# Patient Record
Sex: FEMALE | Race: OTHER | HISPANIC OR LATINO | Employment: UNEMPLOYED | URBAN - NONMETROPOLITAN AREA
[De-identification: names, ages, dates, MRNs, and addresses within clinical notes are randomized per-mention and may not be internally consistent; named-entity substitution may affect disease eponyms.]

---

## 2023-11-02 ENCOUNTER — HOSPITAL ENCOUNTER (EMERGENCY)
Facility: HOSPITAL | Age: 33
Discharge: HOME | End: 2023-11-02
Attending: EMERGENCY MEDICINE

## 2023-11-02 VITALS
DIASTOLIC BLOOD PRESSURE: 78 MMHG | SYSTOLIC BLOOD PRESSURE: 142 MMHG | TEMPERATURE: 98.7 F | WEIGHT: 203.71 LBS | OXYGEN SATURATION: 98 % | RESPIRATION RATE: 20 BRPM | HEART RATE: 72 BPM

## 2023-11-02 DIAGNOSIS — R00.2 PALPITATIONS: ICD-10-CM

## 2023-11-02 DIAGNOSIS — N39.0 UTI (URINARY TRACT INFECTION): ICD-10-CM

## 2023-11-02 DIAGNOSIS — R11.0 NAUSEA: Primary | ICD-10-CM

## 2023-11-02 LAB
ALBUMIN SERPL BCP-MCNC: 4.3 G/DL (ref 3.5–5)
ALP SERPL-CCNC: 48 U/L (ref 34–104)
ALT SERPL W P-5'-P-CCNC: 14 U/L (ref 7–52)
ANION GAP SERPL CALCULATED.3IONS-SCNC: 9 MMOL/L
AST SERPL W P-5'-P-CCNC: 12 U/L (ref 13–39)
BACTERIA UR QL AUTO: ABNORMAL /HPF
BASOPHILS # BLD AUTO: 0.06 THOUSANDS/ÂΜL (ref 0–0.1)
BASOPHILS NFR BLD AUTO: 1 % (ref 0–1)
BILIRUB SERPL-MCNC: 0.23 MG/DL (ref 0.2–1)
BILIRUB UR QL STRIP: NEGATIVE
BUN SERPL-MCNC: 13 MG/DL (ref 5–25)
CALCIUM SERPL-MCNC: 9.2 MG/DL (ref 8.4–10.2)
CARDIAC TROPONIN I PNL SERPL HS: <2 NG/L
CHLORIDE SERPL-SCNC: 108 MMOL/L (ref 96–108)
CLARITY UR: CLEAR
CO2 SERPL-SCNC: 23 MMOL/L (ref 21–32)
COLOR UR: ABNORMAL
CREAT SERPL-MCNC: 0.62 MG/DL (ref 0.6–1.3)
EOSINOPHIL # BLD AUTO: 0.09 THOUSAND/ÂΜL (ref 0–0.61)
EOSINOPHIL NFR BLD AUTO: 1 % (ref 0–6)
ERYTHROCYTE [DISTWIDTH] IN BLOOD BY AUTOMATED COUNT: 11.8 % (ref 11.6–15.1)
EXT PREGNANCY TEST URINE: NEGATIVE
EXT. CONTROL: NORMAL
FLUAV RNA RESP QL NAA+PROBE: NEGATIVE
FLUBV RNA RESP QL NAA+PROBE: NEGATIVE
GFR SERPL CREATININE-BSD FRML MDRD: 120 ML/MIN/1.73SQ M
GLUCOSE SERPL-MCNC: 120 MG/DL (ref 65–140)
GLUCOSE UR STRIP-MCNC: NEGATIVE MG/DL
HCT VFR BLD AUTO: 41.2 % (ref 34.8–46.1)
HGB BLD-MCNC: 13.1 G/DL (ref 11.5–15.4)
HGB UR QL STRIP.AUTO: NEGATIVE
IMM GRANULOCYTES # BLD AUTO: 0.01 THOUSAND/UL (ref 0–0.2)
IMM GRANULOCYTES NFR BLD AUTO: 0 % (ref 0–2)
KETONES UR STRIP-MCNC: NEGATIVE MG/DL
LEUKOCYTE ESTERASE UR QL STRIP: ABNORMAL
LIPASE SERPL-CCNC: 24 U/L (ref 11–82)
LYMPHOCYTES # BLD AUTO: 2.42 THOUSANDS/ÂΜL (ref 0.6–4.47)
LYMPHOCYTES NFR BLD AUTO: 32 % (ref 14–44)
MCH RBC QN AUTO: 31.3 PG (ref 26.8–34.3)
MCHC RBC AUTO-ENTMCNC: 31.8 G/DL (ref 31.4–37.4)
MCV RBC AUTO: 98 FL (ref 82–98)
MONOCYTES # BLD AUTO: 0.63 THOUSAND/ÂΜL (ref 0.17–1.22)
MONOCYTES NFR BLD AUTO: 8 % (ref 4–12)
NEUTROPHILS # BLD AUTO: 4.28 THOUSANDS/ÂΜL (ref 1.85–7.62)
NEUTS SEG NFR BLD AUTO: 58 % (ref 43–75)
NITRITE UR QL STRIP: NEGATIVE
NON-SQ EPI CELLS URNS QL MICRO: ABNORMAL /HPF
NRBC BLD AUTO-RTO: 0 /100 WBCS
PH UR STRIP.AUTO: 5.5 [PH]
PLATELET # BLD AUTO: 331 THOUSANDS/UL (ref 149–390)
PMV BLD AUTO: 9.8 FL (ref 8.9–12.7)
POTASSIUM SERPL-SCNC: 3.8 MMOL/L (ref 3.5–5.3)
PROT SERPL-MCNC: 7.6 G/DL (ref 6.4–8.4)
PROT UR STRIP-MCNC: NEGATIVE MG/DL
RBC # BLD AUTO: 4.19 MILLION/UL (ref 3.81–5.12)
RBC #/AREA URNS AUTO: ABNORMAL /HPF
RSV RNA RESP QL NAA+PROBE: NEGATIVE
SARS-COV-2 RNA RESP QL NAA+PROBE: NEGATIVE
SODIUM SERPL-SCNC: 140 MMOL/L (ref 135–147)
SP GR UR STRIP.AUTO: <=1.005 (ref 1–1.03)
TSH SERPL DL<=0.05 MIU/L-ACNC: 1.39 UIU/ML (ref 0.45–4.5)
UROBILINOGEN UR QL STRIP.AUTO: 0.2 E.U./DL
WBC # BLD AUTO: 7.49 THOUSAND/UL (ref 4.31–10.16)
WBC #/AREA URNS AUTO: ABNORMAL /HPF

## 2023-11-02 PROCEDURE — 96375 TX/PRO/DX INJ NEW DRUG ADDON: CPT

## 2023-11-02 PROCEDURE — 0241U HB NFCT DS VIR RESP RNA 4 TRGT: CPT | Performed by: EMERGENCY MEDICINE

## 2023-11-02 PROCEDURE — 96361 HYDRATE IV INFUSION ADD-ON: CPT

## 2023-11-02 PROCEDURE — 80053 COMPREHEN METABOLIC PANEL: CPT | Performed by: EMERGENCY MEDICINE

## 2023-11-02 PROCEDURE — 99283 EMERGENCY DEPT VISIT LOW MDM: CPT

## 2023-11-02 PROCEDURE — 83690 ASSAY OF LIPASE: CPT | Performed by: EMERGENCY MEDICINE

## 2023-11-02 PROCEDURE — 36415 COLL VENOUS BLD VENIPUNCTURE: CPT | Performed by: EMERGENCY MEDICINE

## 2023-11-02 PROCEDURE — 93005 ELECTROCARDIOGRAM TRACING: CPT

## 2023-11-02 PROCEDURE — 85025 COMPLETE CBC W/AUTO DIFF WBC: CPT | Performed by: EMERGENCY MEDICINE

## 2023-11-02 PROCEDURE — 99285 EMERGENCY DEPT VISIT HI MDM: CPT | Performed by: EMERGENCY MEDICINE

## 2023-11-02 PROCEDURE — 81001 URINALYSIS AUTO W/SCOPE: CPT | Performed by: EMERGENCY MEDICINE

## 2023-11-02 PROCEDURE — 96374 THER/PROPH/DIAG INJ IV PUSH: CPT

## 2023-11-02 PROCEDURE — 84443 ASSAY THYROID STIM HORMONE: CPT | Performed by: EMERGENCY MEDICINE

## 2023-11-02 PROCEDURE — 81025 URINE PREGNANCY TEST: CPT | Performed by: EMERGENCY MEDICINE

## 2023-11-02 PROCEDURE — 84484 ASSAY OF TROPONIN QUANT: CPT | Performed by: EMERGENCY MEDICINE

## 2023-11-02 RX ORDER — KETOROLAC TROMETHAMINE 30 MG/ML
15 INJECTION, SOLUTION INTRAMUSCULAR; INTRAVENOUS ONCE
Status: COMPLETED | OUTPATIENT
Start: 2023-11-02 | End: 2023-11-02

## 2023-11-02 RX ORDER — ONDANSETRON 4 MG/1
4 TABLET, ORALLY DISINTEGRATING ORAL EVERY 6 HOURS PRN
Qty: 20 TABLET | Refills: 0 | Status: SHIPPED | OUTPATIENT
Start: 2023-11-02

## 2023-11-02 RX ORDER — CEPHALEXIN 250 MG/1
500 CAPSULE ORAL ONCE
Status: COMPLETED | OUTPATIENT
Start: 2023-11-02 | End: 2023-11-02

## 2023-11-02 RX ORDER — CEPHALEXIN 500 MG/1
500 CAPSULE ORAL 2 TIMES DAILY
Qty: 10 CAPSULE | Refills: 0 | Status: SHIPPED | OUTPATIENT
Start: 2023-11-02 | End: 2023-11-07

## 2023-11-02 RX ORDER — ONDANSETRON 2 MG/ML
4 INJECTION INTRAMUSCULAR; INTRAVENOUS ONCE
Status: COMPLETED | OUTPATIENT
Start: 2023-11-02 | End: 2023-11-02

## 2023-11-02 RX ADMIN — SODIUM CHLORIDE 1000 ML: 0.9 INJECTION, SOLUTION INTRAVENOUS at 21:12

## 2023-11-02 RX ADMIN — ONDANSETRON 4 MG: 2 INJECTION INTRAMUSCULAR; INTRAVENOUS at 21:11

## 2023-11-02 RX ADMIN — CEPHALEXIN 500 MG: 250 CAPSULE ORAL at 22:40

## 2023-11-02 RX ADMIN — KETOROLAC TROMETHAMINE 15 MG: 30 INJECTION, SOLUTION INTRAMUSCULAR; INTRAVENOUS at 21:12

## 2023-11-03 LAB
ATRIAL RATE: 88 BPM
P AXIS: 66 DEGREES
PR INTERVAL: 152 MS
QRS AXIS: 68 DEGREES
QRSD INTERVAL: 88 MS
QT INTERVAL: 382 MS
QTC INTERVAL: 462 MS
T WAVE AXIS: 43 DEGREES
VENTRICULAR RATE: 88 BPM

## 2023-11-03 PROCEDURE — 93010 ELECTROCARDIOGRAM REPORT: CPT | Performed by: INTERNAL MEDICINE

## 2023-11-03 NOTE — ED PROVIDER NOTES
History  Chief Complaint   Patient presents with    Nausea     C/O nausea, heart racing. Onset 2 hours ago. No vomiting     42-year-old female presents for eval patient of nausea and palpitations. Onset was about 2 hours prior to arrival.  Patient additionally reports nasal congestion, urinary frequency. Patient denies chest pain or dyspnea. Patient denies cough although does have a few episodes of coughing in the room, does admit to nasal congestion, denies known sick contacts. Patient reports nausea otherwise able to eat during the day today, denies vomiting or abdominal pain. She notes increased urinary frequency without painful urination. Patient's last menstrual cycle ended a few days ago and was normal for her. Patient does report 1 episode of lightheadedness earlier without syncope or falls. She states an episode of "tachycardia".  phone was used for this interaction. None       Past Medical History:   Diagnosis Date    Kidney calculus     Renal disorder        History reviewed. No pertinent surgical history. History reviewed. No pertinent family history. I have reviewed and agree with the history as documented. E-Cigarette/Vaping    E-Cigarette Use Never User      E-Cigarette/Vaping Substances     Social History     Tobacco Use    Smoking status: Never    Smokeless tobacco: Never   Vaping Use    Vaping Use: Never used   Substance Use Topics    Alcohol use: Never    Drug use: Never       Review of Systems   Constitutional:  Negative for appetite change and fever. HENT:  Positive for congestion. Negative for sore throat. Respiratory:  Negative for cough and shortness of breath. Cardiovascular:  Positive for palpitations. Negative for chest pain. Gastrointestinal:  Positive for nausea. Negative for abdominal pain, diarrhea and vomiting. Genitourinary:  Positive for frequency. Negative for dysuria. Neurological:  Positive for light-headedness. Negative for syncope. All other systems reviewed and are negative. Physical Exam  Physical Exam  Vitals reviewed. Constitutional:       General: She is not in acute distress. Appearance: Normal appearance. She is not ill-appearing, toxic-appearing or diaphoretic. HENT:      Head: Normocephalic and atraumatic. Right Ear: External ear normal.      Left Ear: External ear normal.      Nose: Nose normal.      Mouth/Throat:      Mouth: Mucous membranes are dry. Eyes:      General: No scleral icterus. Right eye: No discharge. Left eye: No discharge. Cardiovascular:      Rate and Rhythm: Normal rate and regular rhythm. Pulmonary:      Effort: Pulmonary effort is normal. No respiratory distress. Breath sounds: Normal breath sounds. No stridor. No wheezing, rhonchi or rales. Comments: Recurrent dry cough in room  Abdominal:      General: There is no distension. Palpations: Abdomen is soft. Tenderness: There is no abdominal tenderness. There is no guarding or rebound. Musculoskeletal:         General: No deformity or signs of injury. Skin:     General: Skin is warm. Coloration: Skin is not jaundiced or pale. Neurological:      General: No focal deficit present. Mental Status: She is alert.          Vital Signs  ED Triage Vitals [11/02/23 2040]   Temperature Pulse Respirations Blood Pressure SpO2   98.7 °F (37.1 °C) 89 18 138/77 97 %      Temp Source Heart Rate Source Patient Position - Orthostatic VS BP Location FiO2 (%)   Tympanic Monitor Lying Left arm --      Pain Score       No Pain           Vitals:    11/02/23 2040 11/02/23 2130 11/02/23 2200 11/02/23 2230   BP: 138/77 136/80 135/77 142/78   Pulse: 89 67 76 72   Patient Position - Orthostatic VS: Lying            Visual Acuity      ED Medications  Medications   sodium chloride 0.9 % bolus 1,000 mL (0 mL Intravenous Stopped 11/2/23 2216)   ondansetron (ZOFRAN) injection 4 mg (4 mg Intravenous Given 11/2/23 2111) ketorolac (TORADOL) injection 15 mg (15 mg Intravenous Given 11/2/23 2112)   cephalexin (KEFLEX) capsule 500 mg (500 mg Oral Given 11/2/23 2240)       Diagnostic Studies  Results Reviewed       Procedure Component Value Units Date/Time    Urine Microscopic [[id number]]  (Abnormal) Collected: 11/02/23 2202    Lab Status: Final result Specimen: Urine, Other Updated: 11/02/23 2230     RBC, UA 2-4 /hpf      WBC, UA 4-10 /hpf      Epithelial Cells Innumerable /hpf      Bacteria, UA Occasional /hpf     UA w Reflex to Microscopic w Reflex to Culture [[id number]]  (Abnormal) Collected: 11/02/23 2202    Lab Status: Final result Specimen: Urine, Other Updated: 11/02/23 2215     Color, UA Light Yellow     Clarity, UA Clear     Specific Gravity, UA <=1.005     pH, UA 5.5     Leukocytes, UA Small     Nitrite, UA Negative     Protein, UA Negative mg/dl      Glucose, UA Negative mg/dl      Ketones, UA Negative mg/dl      Urobilinogen, UA 0.2 E.U./dl      Bilirubin, UA Negative     Occult Blood, UA Negative    FLU/RSV/COVID - if FLU/RSV clinically relevant [[id number]]  (Normal) Collected: 11/02/23 2109    Lab Status: Final result Specimen: Nares from Nose Updated: 11/02/23 2210     SARS-CoV-2 Negative     INFLUENZA A PCR Negative     INFLUENZA B PCR Negative     RSV PCR Negative    Narrative:      FOR PEDIATRIC PATIENTS - copy/paste COVID Guidelines URL to browser: [email or web address]    SARS-CoV-2 assay is a Nucleic Acid Amplification assay intended for the  qualitative detection of nucleic acid from SARS-CoV-2 in nasopharyngeal  swabs. Results are for the presumptive identification of SARS-CoV-2 RNA. Positive results are indicative of infection with SARS-CoV-2, the virus  causing COVID-19, but do not rule out bacterial infection or co-infection  with other viruses.  Laboratories within the [facility] and its  territories are required to report all positive results to the appropriate  public health authorities. Negative results do not preclude SARS-CoV-2  infection and should not be used as the sole basis for treatment or other  patient management decisions. Negative results must be combined with  clinical observations, patient history, and epidemiological information. This test has not been FDA cleared or approved. This test has been authorized by FDA under an Emergency Use Authorization  (EUA). This test is only authorized for the duration of time the  declaration that circumstances exist justifying the authorization of the  emergency use of an in vitro diagnostic tests for detection of SARS-CoV-2  virus and/or diagnosis of COVID-19 infection under section 564(b)(1) of  the Act, 21 U. S.C. 662DJW-6(T)(6), unless the authorization is terminated  or revoked sooner. The test has been validated but independent review by FDA  and CLIA is pending. Test performed using Cepheid GeneXpert: This RT-PCR assay targets N2,  a region unique to SARS-CoV-2. A conserved region in the E-gene was chosen  for pan-Sarbecovirus detection which includes SARS-CoV-2. According to CMS-2020-01-R, this platform meets the definition of high-throughput technology.     POCT pregnancy, urine [[id number]]  (Normal) Resulted: 11/02/23 2202    Lab Status: Final result Updated: 11/02/23 2202     EXT Preg Test, Ur Negative     Control Valid    TSH, 3rd generation with Free T4 reflex [[id number]]  (Normal) Collected: 11/02/23 2109    Lab Status: Final result Specimen: Blood from Arm, Right Updated: 11/02/23 2156     TSH 3RD GENERATON 1.389 uIU/mL     HS Troponin 0hr (reflex protocol) [[id number]]  (Normal) Collected: 11/02/23 2109    Lab Status: Final result Specimen: Blood from Arm, Right Updated: 11/02/23 2146     hs TnI 0hr <2 ng/L     Comprehensive metabolic panel [[id number]]  (Abnormal) Collected: 11/02/23 2109    Lab Status: Final result Specimen: Blood from Arm, Right Updated: 11/02/23 2141     Sodium 140 mmol/L      Potassium 3.8 mmol/L      Chloride 108 mmol/L      CO2 23 mmol/L      ANION GAP 9 mmol/L      BUN 13 mg/dL      Creatinine 0.62 mg/dL      Glucose 120 mg/dL      Calcium 9.2 mg/dL      AST 12 U/L      ALT 14 U/L      Alkaline Phosphatase 48 U/L      Total Protein 7.6 g/dL      Albumin 4.3 g/dL      Total Bilirubin 0.23 mg/dL      eGFR 120 ml/min/1.73sq m     Narrative:      National Kidney Disease Foundation guidelines for Chronic Kidney Disease (CKD):     Stage 1 with normal or high GFR (GFR > 90 mL/min/1.73 square meters)    Stage 2 Mild CKD (GFR = 60-89 mL/min/1.73 square meters)    Stage 3A Moderate CKD (GFR = 45-59 mL/min/1.73 square meters)    Stage 3B Moderate CKD (GFR = 30-44 mL/min/1.73 square meters)    Stage 4 Severe CKD (GFR = 15-29 mL/min/1.73 square meters)    Stage 5 End Stage CKD (GFR <15 mL/min/1.73 square meters)  Note: GFR calculation is accurate only with a steady state creatinine    Lipase [[id number]]  (Normal) Collected: 11/02/23 2109    Lab Status: Final result Specimen: Blood from Arm, Right Updated: 11/02/23 2141     Lipase 24 u/L     CBC and differential [[id number]] Collected: 11/02/23 2109    Lab Status: Final result Specimen: Blood from Arm, Right Updated: 11/02/23 2124     WBC 7.49 Thousand/uL      RBC 4.19 Million/uL      Hemoglobin 13.1 g/dL      Hematocrit 41.2 %      MCV 98 fL      MCH 31.3 pg      MCHC 31.8 g/dL      RDW 11.8 %      MPV 9.8 fL      Platelets 037 Thousands/uL      nRBC 0 /100 WBCs      Neutrophils Relative 58 %      Immat GRANS % 0 %      Lymphocytes Relative 32 %      Monocytes Relative 8 %      Eosinophils Relative 1 %      Basophils Relative 1 %      Neutrophils Absolute 4.28 Thousands/µL      Immature Grans Absolute 0.01 Thousand/uL      Lymphocytes Absolute 2.42 Thousands/µL      Monocytes Absolute 0.63 Thousand/µL      Eosinophils Absolute 0.09 Thousand/µL      Basophils Absolute 0.06 Thousands/µL                    No orders to display Procedures  Procedures         ED Course  ED Course as of 11/03/23 1524   Thu Nov 02, 2023 2035 Ambulatory to room   2122 Procedure Note: EKG  Date/Time: 11/02/23 9:23 PM   Interpreted by: Angel Lal  Indications / Diagnosis: palpitations  ECG reviewed by me, the ED Provider: yes   The EKG demonstrates:  Rhythm: normal sinus  Intervals: normal intervals; QT noted at prolonged however consistent with previous values, today 382  Axis: normal axis  QRS/Blocks: normal QRS  ST Changes: No STD/TEJ. Non-specific TW abnormality, no previous to compare to in MUSE.       2125 CBC and differential  Within normal   2143 Lipase: 24  negative   2143 Comprehensive metabolic panel(!)  Within normal   2148 hs TnI 0hr: <2  negative             HEART Risk Score      Flowsheet Row Most Recent Value   Heart Score Risk Calculator    History 0 Filed at: 11/02/2023 2112   ECG 0 Filed at: 11/02/2023 2112   Age 0 Filed at: 11/02/2023 2112   Risk Factors 0 Filed at: 11/02/2023 2112   Troponin 0 Filed at: 11/02/2023 2112   HEART Score 0 Filed at: 11/02/2023 2112                                        Medical Decision Making  37-year-old female presents for evaluation, she states about 2 hours prior to arrival onset of nausea as well as episode of palpitations. Patient's heart rate is currently normal, on monitor appears to be normal sinus rhythm however will obtain EKG to assess for arrhythmia. Patient denies significant cold symptoms though states a runny nose, in room she frequently coughs, will obtain viral panel screening. We will check a urine pregnancy and additional urinalysis for urinary frequency. Patient has history of nephrolithiasis, denies abdominal or flank pain; doubt this is the source of her symptoms. We will additionally assess a troponin, TSH, metabolic panel for electrolyte abnormalities as a source of her palpitations. Will assess for biliary pathology as well as the lipase for pancreatitis.  Symptoms possibly viral in nature. Amount and/or Complexity of Data Reviewed  Labs: ordered. Decision-making details documented in ED Course. Risk  Prescription drug management. Disposition  Final diagnoses:   Nausea   Palpitations   UTI (urinary tract infection)     Time reflects when diagnosis was documented in both MDM as applicable and the Disposition within this note       Time User Action Codes Description Comment    11/2/2023  9:43 PM [employer] Add [R11.0] Nausea     11/2/2023  9:44 PM [employer] Add [R00.2] Palpitations     11/2/2023 10:37 PM Zabrina, [address] [place] [address] [place] [N39.0] UTI (urinary tract infection)           ED Disposition       ED Disposition   Discharge    Condition   Stable    Date/Time   Thu Nov 2, 2023 2237    [facility] discharge to home/self care. Follow-up Information       Follow up With Specialties Details Why Contact Info Additional Information    [address] [facility] Family Medicine Schedule an appointment as soon as possible for a visit   [address] [facility] [phone]  [address], [address] [place] [address], [address] [place] [address]    [address] [place] [address] Family Medicine Call  For re-evaluation [address] [id number] [place] [address] [phone]            Discharge Medication List as of 11/2/2023 10:41 PM        START taking these medications    Details   cephalexin (KEFLEX) 500 mg capsule Take 1 capsule (500 mg total) by mouth 2 (two) times a day for 5 days, Starting Thu 11/2/2023, Until Tue 11/7/2023, Normal      ondansetron (Zofran ODT) 4 mg disintegrating tablet Take 1 tablet (4 mg total) by mouth every 6 (six) hours as needed for nausea or vomiting, Starting Thu 11/2/2023, Normal             No discharge procedures on file.     PDMP Review None            ED Provider  Electronically Signed by             Mk Stubbs DO  11/03/23 1523

## 2024-08-22 ENCOUNTER — OFFICE VISIT (OUTPATIENT)
Dept: URGENT CARE | Facility: MEDICAL CENTER | Age: 34
End: 2024-08-22

## 2024-08-22 VITALS
BODY MASS INDEX: 34.16 KG/M2 | RESPIRATION RATE: 14 BRPM | SYSTOLIC BLOOD PRESSURE: 122 MMHG | WEIGHT: 205 LBS | TEMPERATURE: 97.3 F | HEIGHT: 65 IN | OXYGEN SATURATION: 99 % | HEART RATE: 93 BPM | DIASTOLIC BLOOD PRESSURE: 74 MMHG

## 2024-08-22 DIAGNOSIS — R07.9 CHEST PAIN, UNSPECIFIED TYPE: Primary | ICD-10-CM

## 2024-08-22 DIAGNOSIS — R63.5 WEIGHT GAIN: ICD-10-CM

## 2024-08-22 PROCEDURE — G0381 LEV 2 HOSP TYPE B ED VISIT: HCPCS | Performed by: PHYSICIAN ASSISTANT

## 2024-08-22 PROCEDURE — 93005 ELECTROCARDIOGRAM TRACING: CPT | Performed by: PHYSICIAN ASSISTANT

## 2024-08-22 RX ORDER — NORETHINDRONE ACETATE AND ETHINYL ESTRADIOL 1; 5 MG/1; UG/1
TABLET ORAL DAILY
COMMUNITY
End: 2024-08-29

## 2024-08-22 RX ORDER — MECLIZINE HCL 12.5 MG 12.5 MG/1
12.5 TABLET ORAL 3 TIMES DAILY PRN
COMMUNITY
Start: 2024-07-09 | End: 2024-08-29

## 2024-08-22 NOTE — PROGRESS NOTES
[facility]        NAME: Duong Garcia is a 34 y.o. female  : 1990    MRN: [id number]  DATE: 2024  TIME: 10:51 AM    Assessment and Plan   Chest pain, unspecified type [R07.9]  1. Chest pain, unspecified type  ECG 12 lead      2. Weight gain          Patient advised her symptoms require further workup which care now cannot complete.  Point-of-care EKG normal.  Anxiety could be playing part for some of her symptomatology.  Concern for possible sleep apnea.  Concern for thyroid disorder.  Patient given contact information for [facility]  In [place].  If symptoms worsen in the interim she is to go to the emergency room for further evaluation.    Patient Instructions       Follow up with PCP in 3-5 days.  Proceed to  ER if symptoms worsen.    If tests have been performed at [facility], our office will contact you with results if changes need to be made to the care plan discussed with you at the visit.  You can review your full results on [facility].    Chief Complaint     Chief Complaint   Patient presents with   • Shortness of Breath     Started about a week ago. The symptoms are constant and at night she has to sleep sitting up because the knot in the throat gets worse and if she eats later she has bad acid reflux. SOB is constant, she has to take deep breaths in between her normal breathing. When she gets awake she feels like her arms are still asleep. Her food feels like it gets stuck in her throat when she is swallowing and then it feels like it is stuck all day. Tired all the time, takes frequent naps.    • Dizziness   • Chest Pain   • Nausea   • Fatigue   • knot in throat   • Difficulty Swallowing   • dry mouth         History of Present Illness        utilized for this visit.  Patient presents fatigue, chest pain, nausea, difficulty swallowing, dry mouth dizziness and shortness of breath.  She states her mouth and eyes very dry.  Also she  needs to sleep propped up.  States she does snore and feels she wakes up many times in the middle of the night.  Admits to daytime sleepiness and taking frequent naps.  When she eats she feels her food is getting stuck.  Also admits to 10 pound weight gain but denies changes in hair or skin or nails.  Feels she has swelling around her neck which is increasing in size.  She has a lot of stress in her life as she is intermittently  from her spouse whom she  6 months ago.        Review of Systems   Review of Systems   Constitutional:  Positive for fatigue and unexpected weight change (wt gain).   HENT:  Negative for sore throat and voice change.    Respiratory:  Negative for cough.    Cardiovascular:  Positive for chest pain.   Gastrointestinal:  Positive for nausea.   Musculoskeletal:  Negative for neck pain.   Skin:  Negative for rash.   Neurological:  Positive for dizziness.         Current Medications       Current Outpatient Medications:   •  meclizine (ANTIVERT) 12.5 MG tablet, Take 12.5 mg by mouth 3 (three) times a day as needed for dizziness, Disp: , Rfl:   •  norethindrone-ethinyl estradiol (FEMHRT 1/5) 1-5 MG-MCG TABS, Take by mouth daily, Disp: , Rfl:   •  ondansetron (Zofran ODT) 4 mg disintegrating tablet, Take 1 tablet (4 mg total) by mouth every 6 (six) hours as needed for nausea or vomiting, Disp: 20 tablet, Rfl: 0    Current Allergies     Allergies as of 08/22/2024   • (No Known Allergies)            The following portions of the patient's history were reviewed and updated as appropriate: allergies, current medications, past family history, past medical history, past social history, past surgical history and problem list.     Past Medical History:   Diagnosis Date   • Kidney calculus    • Renal disorder        History reviewed. No pertinent surgical history.    History reviewed. No pertinent family history.      Medications have been verified.        Objective   /74   Pulse 93   " Temp (!) 97.3 °F (36.3 °C)   Resp 14   Ht 5' 5\" (1.651 m)   Wt 93 kg (205 lb)   SpO2 99%   BMI 34.11 kg/m²   No LMP recorded.       Physical Exam     Physical Exam  Vitals and nursing note reviewed.   Constitutional:       Appearance: She is well-developed.   HENT:      Head: Normocephalic and atraumatic.   Neck:      Trachea: No tracheal deviation.      Comments: No definitive goiter  Cardiovascular:      Rate and Rhythm: Normal rate and regular rhythm.      Heart sounds: Normal heart sounds.   Pulmonary:      Effort: Pulmonary effort is normal.      Breath sounds: Normal breath sounds.   Abdominal:      Palpations: Abdomen is soft. There is no hepatomegaly or mass.      Tenderness: There is no guarding or rebound.   Musculoskeletal:      Cervical back: Neck supple.   Lymphadenopathy:      Cervical: No cervical adenopathy.   Skin:     General: Skin is warm.   Neurological:      Mental Status: She is alert.                 "

## 2024-08-23 LAB
ATRIAL RATE: 66 BPM
P AXIS: 26 DEGREES
PR INTERVAL: 162 MS
QRS AXIS: 58 DEGREES
QRSD INTERVAL: 88 MS
QT INTERVAL: 396 MS
QTC INTERVAL: 415 MS
T WAVE AXIS: 22 DEGREES
VENTRICULAR RATE: 66 BPM

## 2024-08-23 PROCEDURE — 93010 ELECTROCARDIOGRAM REPORT: CPT | Performed by: INTERNAL MEDICINE

## 2024-08-28 ENCOUNTER — TELEPHONE (OUTPATIENT)
Dept: DENTISTRY | Facility: CLINIC | Age: 34
End: 2024-08-28

## 2024-08-28 NOTE — TELEPHONE ENCOUNTER
Called PT using [employer] to confirm appt.  PT said she will attend and is aware of the special program and the copay.  SB

## 2024-08-29 ENCOUNTER — OFFICE VISIT (OUTPATIENT)
Dept: FAMILY MEDICINE CLINIC | Facility: CLINIC | Age: 34
End: 2024-08-29
Payer: COMMERCIAL

## 2024-08-29 VITALS
TEMPERATURE: 98.4 F | RESPIRATION RATE: 18 BRPM | DIASTOLIC BLOOD PRESSURE: 60 MMHG | OXYGEN SATURATION: 98 % | WEIGHT: 206 LBS | SYSTOLIC BLOOD PRESSURE: 100 MMHG | BODY MASS INDEX: 34.28 KG/M2 | HEART RATE: 87 BPM

## 2024-08-29 DIAGNOSIS — R09.82 PND (POST-NASAL DRIP): ICD-10-CM

## 2024-08-29 DIAGNOSIS — R53.82 CHRONIC FATIGUE: ICD-10-CM

## 2024-08-29 DIAGNOSIS — K59.00 CONSTIPATION, UNSPECIFIED CONSTIPATION TYPE: ICD-10-CM

## 2024-08-29 DIAGNOSIS — Z00.00 ENCOUNTER FOR MEDICAL EXAMINATION TO ESTABLISH CARE: Primary | ICD-10-CM

## 2024-08-29 LAB
25(OH)D3 SERPL-MCNC: 21.9 NG/ML (ref 30–100)
ALBUMIN SERPL BCG-MCNC: 4.4 G/DL (ref 3.5–5)
ALP SERPL-CCNC: 41 U/L (ref 34–104)
ALT SERPL W P-5'-P-CCNC: 18 U/L (ref 7–52)
ANION GAP SERPL CALCULATED.3IONS-SCNC: 7 MMOL/L (ref 4–13)
AST SERPL W P-5'-P-CCNC: 18 U/L (ref 13–39)
BASOPHILS # BLD AUTO: 0.06 THOUSANDS/ÂΜL (ref 0–0.1)
BASOPHILS NFR BLD AUTO: 1 % (ref 0–1)
BILIRUB SERPL-MCNC: 0.34 MG/DL (ref 0.2–1)
BUN SERPL-MCNC: 10 MG/DL (ref 5–25)
CALCIUM SERPL-MCNC: 9.4 MG/DL (ref 8.4–10.2)
CHLORIDE SERPL-SCNC: 104 MMOL/L (ref 96–108)
CHOLEST SERPL-MCNC: 119 MG/DL
CO2 SERPL-SCNC: 28 MMOL/L (ref 21–32)
CREAT SERPL-MCNC: 0.58 MG/DL (ref 0.6–1.3)
EOSINOPHIL # BLD AUTO: 0.09 THOUSAND/ÂΜL (ref 0–0.61)
EOSINOPHIL NFR BLD AUTO: 2 % (ref 0–6)
ERYTHROCYTE [DISTWIDTH] IN BLOOD BY AUTOMATED COUNT: 12 % (ref 11.6–15.1)
EST. AVERAGE GLUCOSE BLD GHB EST-MCNC: 111 MG/DL
FERRITIN SERPL-MCNC: 56 NG/ML (ref 11–307)
GFR SERPL CREATININE-BSD FRML MDRD: 122 ML/MIN/1.73SQ M
GLUCOSE P FAST SERPL-MCNC: 79 MG/DL (ref 65–99)
HBA1C MFR BLD: 5.5 %
HCT VFR BLD AUTO: 41.6 % (ref 34.8–46.1)
HDLC SERPL-MCNC: 41 MG/DL
HGB BLD-MCNC: 13.4 G/DL (ref 11.5–15.4)
IMM GRANULOCYTES # BLD AUTO: 0 THOUSAND/UL (ref 0–0.2)
IMM GRANULOCYTES NFR BLD AUTO: 0 % (ref 0–2)
IRON SATN MFR SERPL: 28 % (ref 15–50)
IRON SERPL-MCNC: 106 UG/DL (ref 50–212)
LDLC SERPL CALC-MCNC: 66 MG/DL (ref 0–100)
LYMPHOCYTES # BLD AUTO: 2.14 THOUSANDS/ÂΜL (ref 0.6–4.47)
LYMPHOCYTES NFR BLD AUTO: 49 % (ref 14–44)
MCH RBC QN AUTO: 32.1 PG (ref 26.8–34.3)
MCHC RBC AUTO-ENTMCNC: 32.2 G/DL (ref 31.4–37.4)
MCV RBC AUTO: 100 FL (ref 82–98)
MONOCYTES # BLD AUTO: 0.44 THOUSAND/ÂΜL (ref 0.17–1.22)
MONOCYTES NFR BLD AUTO: 10 % (ref 4–12)
NEUTROPHILS # BLD AUTO: 1.66 THOUSANDS/ÂΜL (ref 1.85–7.62)
NEUTS SEG NFR BLD AUTO: 38 % (ref 43–75)
NONHDLC SERPL-MCNC: 78 MG/DL
NRBC BLD AUTO-RTO: 0 /100 WBCS
PLATELET # BLD AUTO: 307 THOUSANDS/UL (ref 149–390)
PMV BLD AUTO: 10.3 FL (ref 8.9–12.7)
POTASSIUM SERPL-SCNC: 4.3 MMOL/L (ref 3.5–5.3)
PROT SERPL-MCNC: 7.3 G/DL (ref 6.4–8.4)
RBC # BLD AUTO: 4.17 MILLION/UL (ref 3.81–5.12)
SODIUM SERPL-SCNC: 139 MMOL/L (ref 135–147)
T4 FREE SERPL-MCNC: 0.94 NG/DL (ref 0.61–1.12)
TIBC SERPL-MCNC: 374 UG/DL (ref 250–450)
TRIGL SERPL-MCNC: 59 MG/DL
TSH SERPL DL<=0.05 MIU/L-ACNC: 2.09 UIU/ML (ref 0.45–4.5)
UIBC SERPL-MCNC: 268 UG/DL (ref 155–355)
VIT B12 SERPL-MCNC: 264 PG/ML (ref 180–914)
WBC # BLD AUTO: 4.39 THOUSAND/UL (ref 4.31–10.16)

## 2024-08-29 PROCEDURE — 84443 ASSAY THYROID STIM HORMONE: CPT | Performed by: PHYSICIAN ASSISTANT

## 2024-08-29 PROCEDURE — 82607 VITAMIN B-12: CPT | Performed by: PHYSICIAN ASSISTANT

## 2024-08-29 PROCEDURE — 84439 ASSAY OF FREE THYROXINE: CPT | Performed by: PHYSICIAN ASSISTANT

## 2024-08-29 PROCEDURE — 83036 HEMOGLOBIN GLYCOSYLATED A1C: CPT | Performed by: PHYSICIAN ASSISTANT

## 2024-08-29 PROCEDURE — 80061 LIPID PANEL: CPT | Performed by: PHYSICIAN ASSISTANT

## 2024-08-29 PROCEDURE — 82306 VITAMIN D 25 HYDROXY: CPT | Performed by: PHYSICIAN ASSISTANT

## 2024-08-29 PROCEDURE — 85025 COMPLETE CBC W/AUTO DIFF WBC: CPT | Performed by: PHYSICIAN ASSISTANT

## 2024-08-29 PROCEDURE — 83540 ASSAY OF IRON: CPT | Performed by: PHYSICIAN ASSISTANT

## 2024-08-29 PROCEDURE — 80053 COMPREHEN METABOLIC PANEL: CPT | Performed by: PHYSICIAN ASSISTANT

## 2024-08-29 PROCEDURE — 82728 ASSAY OF FERRITIN: CPT | Performed by: PHYSICIAN ASSISTANT

## 2024-08-29 PROCEDURE — T1015 CLINIC SERVICE: HCPCS | Performed by: FAMILY MEDICINE

## 2024-08-29 PROCEDURE — 83550 IRON BINDING TEST: CPT | Performed by: PHYSICIAN ASSISTANT

## 2024-08-29 RX ORDER — CETIRIZINE HYDROCHLORIDE 10 MG/1
10 TABLET ORAL
Qty: 30 TABLET | Refills: 0 | Status: SHIPPED | OUTPATIENT
Start: 2024-08-29

## 2024-08-29 NOTE — PATIENT INSTRUCTIONS
"Patient Education     Estreñimiento en adultos   Conceptos Básicos   Redactado por los médicos y editores de UpToDate   ¿Qué es el estreñimiento? -- El estreñimiento es un problema común que dificulta la evacuación. Las evacuaciones podrían:   Ser demasiado duras   Ser demasiado pequeñas   Ser difíciles de evacuar   Suceder menos de cristobal veces por semana  ¿Cuáles son las causas del estreñimiento? -- El estreñimiento puede aparecer a causa de:   Efectos secundarios de algunas medicinas   Jaz alimentación deficiente   Enfermedades del sistema digestivo (figura 1)  ¿A qué otros síntomas keysha prestar atención? -- Estos síntomas podrían indicar un problema más grave:   Bishop en el sanitario o en el papel higiénico después de evacuar   Fiebre   Pérdida de peso   Sensación de debilidad  También podría ser indicio de que hay algún problema si comienza a tener estreñimiento sin felicia cambiado de medicinas ni alimentación, y nunca antes había tenido estreñimiento.  ¿Hay algo que pueda hacer por mi cuenta para evitar el estreñimiento? -- Sí. Pruebe las siguientes alternativas:   Consuma alimentos que tengan mucha fibra, por ejemplo, frutas, verduras, jugo de ciruelas pasas y cereales (tabla 1).   Paloma mucha agua y otros líquidos.   Cuando sienta ganas de ir al sanitario, hágalo; no aguante.   Echelon laxantes. Son medicinas que facilitan las evacuaciones. Algunas son píldoras para tragar. Otras medicinas se colocan en el recto. Estas últimas se llaman \"supositorios\" o \"enemas\".  ¿Keysha consultar a un médico o enfermero? -- Consulte a stringer médico o enfermero si:   Evita síntomas son nuevos y anormales para usted.   No evacua kareem varios días.   El problema aparece y desaparece, walter dura más de cristobal semanas.   Siente mucho dolor.   Tiene otros síntomas que también le preocupan (por ejemplo: sangrado, debilidad, pérdida de peso o fiebre).   Otras personas en stringer gala llanos tenido cáncer colorrectal o enfermedad intestinal " "inflamatoria.  ¿Keysha hacerme pruebas? -- Stringer médico o enfermero decidirá qué pruebas debe hacerse según stringer edad, otros síntomas y stringer situación personal. Hay muchas pruebas, walter raffaele vez no necesite ninguna.  Estas son las pruebas más comunes que los médicos utilizan para determinar la causa del estreñimiento:   Examen rectal - El médico observa la abdullahi exterior del ano. También introduce un dedo para explorar el interior de la abertura.   Sigmoidoscopía o colonoscopía - En estas pruebas, el médico introduce un tubo negron en el ano. Luego, desplaza el tubo hasta el interior del intestino grueso. El intestino grueso también se llama colon. El tubo tiene saroj cámara integrada que le permite al médico nomi el interior de los intestinos. En estas pruebas, el médico también puede abner muestras de tejido para analizarlas con el microscopio (figura 2).   Radiografía, tomografía o resonancia magnética nuclear - Permiten crear imágenes del interior del cuerpo.   Estudios de manometría - La manometría le permite al médico medir la presión en varios puntos dentro del recto. Puede ayudar al médico a determinar si los músculos que controlan las evacuaciones están funcionando correctamente. La prueba también muestra si el recto de la persona puede sentir normalmente.  ¿Cómo se trata el estreñimiento? -- Depende de la causa del estreñimiento. En primer lugar, el médico le pedirá que trate de consumir más fibra y beber más agua. Si eso no ayuda, es posible que el médico recomiende:   Medicinas para tragar o colocar en el recto   Cambiar las medicinas que está usando para tratar otros padecimientos   Un tratamiento llamado \"enema\" - Los enemas pueden ser solo de agua, o pueden tener saroj medicina que ayude con el estreñimiento.   Biorretroalimentación - Esta es saroj técnica que le permite relajar los músculos para que pueda empujar y eliminar las evacuaciones.  ¿Se puede prevenir el estreñimiento? -- Puede disminuir las posibilidades " "de tener estreñimiento nuevamente si:   Lleva saroj dieta con un alto contenido de fibra (tabla 1).   Ojlene agua y otros líquidos kareem el día.   Usa el sanitario a la misma hora todos los días.  Todos los artículos se actualizan a medida que se descubre nueva evidencia y culmina nuestro proceso de evaluación por homólogos   Adama artículo se recuperó de UpToDate el: Feb 26, 2024.  Artículo 47425 Versión 9.0.es-419.1  Release: 32.2.4 - C32.56  © 2024 [facility] Todos los derechos reservados.  figura 1: El sistema digestivo     En adama dibujo se muestran los órganos del cuerpo que procesan los alimentos. El conjunto de estos órganos se llama \"sistema digestivo\" o \"tracto digestivo\". A medida que los alimentos se desplazan por adama sistema, el cuerpo absorbe nutrientes y agua.  Gráfico 64701 Versión 5.0  tabla 1: Cantidad de fibra en diferentes alimentos  Alimento  Porción  Gramos de fibra    Frutas    Manzana (con cáscara) 1 manzana mediana 4.4   Plátano 1 plátano mediano 3.1   Naranjas 1 naranja 3.1   Ciruelas pasas 1 taza, sin semillas 12.4   Jugos    Manzana, sin endulzar, con ácido ascórbico agregado 1 taza 0.5   Pomelo, farooq, enlatado, endulzado 1 taza 0.2   Uva, sin endulzar, con ácido ascórbico agregado 1 taza 0.5   Shiner 1 taza 0.7   Verduras    Cocidas   Frijoles verdes (habichuelas) 1 taza 4.0   Zanahorias 1/2 taza, cortadas 2.3   Arvejas 1 taza 8.8   Papa (horneada, con cáscara) 1 papa mediana 3.8   Crudas   Pepino (con piel) 1 pepino 1.5   Hilary 1 taza, cortada 0.5   Tomate 1 tomate mediano 1.5   Espinaca 1 taza 0.7   Legumbres   Frijoles cocidos, enlatados, sin sal agregada 1 taza 13.9   Frijoles, enlatados 1 taza 13.6   Frijoles de lima (habas), enlatados 1 taza 11.6   Lentejas, hervidas 1 taza 15.6   Panes, pastas, harinas    Panecillos de salvado 1 panecillo mediano 5.2   Cereal de pancho, cocido 1 taza 4.0   Pan farooq 1 rebanada 0.6   Pan de steve integral 1 rebanada 1.9   Pasta y arroz, " cocidos   Macarrones 1 taza 2.5   Arroz, integral 1 taza 3.5   Arroz, farooq 1 taza 0.6   Fideos (espagueti, comunes) 1 taza 2.5   Mercedez secos    Almendras 1/2 taza 8.7   Maní 1/2 taza 7.9   Para saber cuánta fibra y otros nutrientes hay en diferentes alimentos, visite la central de datos de alimentación en el sitio web del [employer] ([place] [employer], [facility], [employer]).  Gráfico 88036 Versión 6.0  figura 2: Colonoscopía     Adry saroj colonoscopía, usted se acuesta de costado y el médico le introduce un tubo negron con saroj cámara en el ano (desde atrás). A continuación, el médico lleva el tubo hasta el recto y el colon. La cámara envía imágenes de video desde el interior del colon a saroj pantalla de televisión.  Gráfico 22371 Versión 8.0  Exención de responsabilidad y uso de la información del consumidor   Descargo de responsabilidad: esta información generalizada es un resumen limitado de información sobre el diagnóstico, el tratamiento y/o los medicamentos. No pretende ser exhaustiva y se debe utilizar mariana herramienta para ayudar al usuario a comprender y/o evaluar las posibles opciones de diagnóstico y tratamiento. No incluye toda la información sobre afecciones, tratamientos, medicamentos, efectos secundarios o riesgos puedan ser aplicables a un paciente específico. No tiene el propósito de servir mariana recomendación médica ni de sustituir la recomendación médica, el diagnóstico o el tratamiento de un profesional de atención médica que se base en el examen y la evaluación de adama profesional de la fahad respecto a las circunstancias específicas y únicas del paciente. Los pacientes deben hablar con un profesional de atención médica para obtener información completa sobre stringer fahad, cuestiones médicas y opciones de tratamiento, incluidos los riesgos o los beneficios relacionados con el uso de medicamentos. Esta información no certifica que los  tratamientos o medicamentos sivakumar seguros, eficaces o estén aprobados para tratar a un paciente específico. [employer] y enrrique afiliados renuncian a cualquier garantía o responsabilidad relacionada con esta información o el uso de la misma.El uso de esta información está sujeto a las Condiciones de uso, disponibles en https://www.[employer] Authoria[employer].com/en/know/clinical-effectiveness-terms. 2024© [employer] Inc. y enrrique afiliados y/o licenciantes. Todos los derechos reservados.  Copyright   © 2024 [employer]. Todos los derechos reservados.

## 2024-08-29 NOTE — PROGRESS NOTES
Ambulatory Visit  Name: Duong Garcia      : 1990      MRN: [id number]  Encounter Provider: Melia Anne PA-C  Encounter Date: 2024   Encounter department: [facility]    Assessment & Plan   1. Encounter for medical examination to establish care  -     CBC and differential; Future  -     Comprehensive metabolic panel; Future  -     Hemoglobin A1C; Future  -     TSH + Free T4; Future  -     Vitamin D 25 hydroxy; Future  -     Vitamin B12; Future  -     Lipid panel; Future  -     Iron Panel (Includes Ferritin, Iron Sat%, Iron, and TIBC); Future  2. Chronic fatigue  -     CBC and differential; Future  -     Comprehensive metabolic panel; Future  -     Hemoglobin A1C; Future  -     TSH + Free T4; Future  -     Vitamin D 25 hydroxy; Future  -     Vitamin B12; Future  -     Lipid panel; Future  -     Iron Panel (Includes Ferritin, Iron Sat%, Iron, and TIBC); Future  3. Constipation, unspecified constipation type  4. PND (post-nasal drip)  -     cetirizine (ZyrTEC) 10 mg tablet; Take 1 tablet (10 mg total) by mouth daily at bedtime    Suspect throat sensation is from PND. Will try zyrtec nightly  Cont metamucil for constipation. Increase water  Labs drawn in office today. FUP 2 week for lab review / response to medications or sooner if concerns arise      History of Present Illness     33 y/o female presents to establish care. She has no PMH and takes no daily medications. She is Danish speaking,  #[id number] utilied.       Complaints of Lump in throat, feel as though food is getting stuck. She has a lot of mucus and PND. Denies cough, CP, SOB.    She also admits tosomach pain x 2 weeks and Constipation - going daily while taking metamucil, otherwise goes every 4 days. She has been taking this for about 1 week. Since then her stomach pain as resolved.            Review of Systems   Constitutional:  Negative for activity change, appetite change, chills,  fatigue and fever.   HENT:  Positive for postnasal drip and rhinorrhea. Negative for congestion, sinus pressure, sinus pain, sore throat and trouble swallowing.         Dysphagia    Respiratory:  Positive for cough. Negative for chest tightness, shortness of breath and wheezing.    Gastrointestinal:  Positive for abdominal pain, constipation and nausea. Negative for abdominal distention, diarrhea and vomiting.   Musculoskeletal:  Negative for arthralgias and myalgias.   Neurological:  Negative for dizziness, seizures, speech difficulty and headaches.   Psychiatric/Behavioral: Negative.       Medical History Reviewed by provider this encounter:  Tobacco  Allergies  Meds  Problems  Med Hx  Surg Hx  Fam Hx       Objective     /60   Pulse 87   Temp 98.4 °F (36.9 °C)   Resp 18   Wt 93.4 kg (206 lb)   LMP 08/25/2024   SpO2 98%   BMI 34.28 kg/m²     Physical Exam  Vitals and nursing note reviewed.   Constitutional:       General: She is not in acute distress.     Appearance: Normal appearance. She is well-developed. She is obese.   HENT:      Head: Normocephalic and atraumatic.      Right Ear: Ear canal and external ear normal. A middle ear effusion is present. Tympanic membrane is bulging.      Left Ear: Tympanic membrane, ear canal and external ear normal.      Nose:      Right Turbinates: Enlarged.      Left Turbinates: Enlarged.      Mouth/Throat:      Pharynx: Posterior oropharyngeal erythema and postnasal drip present. No pharyngeal swelling, oropharyngeal exudate or uvula swelling.   Eyes:      Conjunctiva/sclera: Conjunctivae normal.   Cardiovascular:      Rate and Rhythm: Normal rate and regular rhythm.      Heart sounds: No murmur heard.  Pulmonary:      Effort: Pulmonary effort is normal. No respiratory distress.      Breath sounds: Normal breath sounds.   Abdominal:      Palpations: Abdomen is soft.      Tenderness: There is no abdominal tenderness.   Musculoskeletal:         General: No  swelling.      Cervical back: Neck supple.   Skin:     General: Skin is warm and dry.      Capillary Refill: Capillary refill takes less than 2 seconds.   Neurological:      Mental Status: She is alert and oriented to person, place, and time. Mental status is at baseline.   Psychiatric:         Mood and Affect: Mood normal.         Behavior: Behavior normal.         Thought Content: Thought content normal.         Judgment: Judgment normal.       Administrative Statements   I have spent a total time of 60 minutes in caring for this patient on the day of the visit/encounter including Prognosis, Risks and benefits of tx options, Instructions for management, Patient and family education, Risk factor reductions, Impressions, Documenting in the medical record, Reviewing / ordering tests, medicine, procedures  , and Obtaining or reviewing history  .

## 2024-08-30 ENCOUNTER — TELEPHONE (OUTPATIENT)
Dept: FAMILY MEDICINE CLINIC | Facility: HOME HEALTHCARE | Age: 34
End: 2024-08-30

## 2024-08-30 DIAGNOSIS — E55.9 VITAMIN D DEFICIENCY: Primary | ICD-10-CM

## 2024-08-30 RX ORDER — VITAMIN B COMPLEX
1000 TABLET ORAL DAILY
Qty: 90 TABLET | Refills: 1 | Status: SHIPPED | OUTPATIENT
Start: 2024-08-30

## 2024-08-30 NOTE — TELEPHONE ENCOUNTER
Patient made aware of results and Vitamin D supplement sent to the pharmacy. Patient acknowledged and verbalized understanding. ----- Message from Melia Anne PA-C sent at 8/30/2024 11:40 AM EDT -----  Patient is Greenlandic speaking.    Please let her know overall her lab work is normal. Her vit D is a little low. I have sent in a supplement for her to take once a day with food. We will follow up at her apt as already scheduled.kareen garcia!

## 2024-09-11 DIAGNOSIS — R09.82 PND (POST-NASAL DRIP): ICD-10-CM

## 2024-09-12 RX ORDER — CETIRIZINE HYDROCHLORIDE 10 MG/1
10 TABLET ORAL
Qty: 90 TABLET | Refills: 1 | Status: SHIPPED | OUTPATIENT
Start: 2024-09-12

## 2024-11-03 ENCOUNTER — HOSPITAL ENCOUNTER (EMERGENCY)
Facility: HOSPITAL | Age: 34
Discharge: HOME | End: 2024-11-03
Attending: EMERGENCY MEDICINE

## 2024-11-03 ENCOUNTER — APPOINTMENT (EMERGENCY)
Dept: RADIOLOGY | Facility: HOSPITAL | Age: 34
End: 2024-11-03

## 2024-11-03 ENCOUNTER — APPOINTMENT (EMERGENCY)
Dept: CT IMAGING | Facility: HOSPITAL | Age: 34
End: 2024-11-03

## 2024-11-03 VITALS
RESPIRATION RATE: 20 BRPM | OXYGEN SATURATION: 94 % | SYSTOLIC BLOOD PRESSURE: 137 MMHG | DIASTOLIC BLOOD PRESSURE: 82 MMHG | HEART RATE: 104 BPM

## 2024-11-03 DIAGNOSIS — B34.9 ACUTE VIRAL SYNDROME: Primary | ICD-10-CM

## 2024-11-03 DIAGNOSIS — R79.89 POSITIVE D DIMER: ICD-10-CM

## 2024-11-03 LAB
ALBUMIN SERPL BCG-MCNC: 4.6 G/DL (ref 3.5–5)
ALP SERPL-CCNC: 35 U/L (ref 34–104)
ALT SERPL W P-5'-P-CCNC: 20 U/L (ref 7–52)
ANION GAP SERPL CALCULATED.3IONS-SCNC: 10 MMOL/L (ref 4–13)
AST SERPL W P-5'-P-CCNC: 16 U/L (ref 13–39)
BASOPHILS # BLD AUTO: 0.06 THOUSANDS/ΜL (ref 0–0.1)
BASOPHILS NFR BLD AUTO: 1 % (ref 0–1)
BILIRUB SERPL-MCNC: 0.34 MG/DL (ref 0.2–1)
BUN SERPL-MCNC: 9 MG/DL (ref 5–25)
CALCIUM SERPL-MCNC: 9.6 MG/DL (ref 8.4–10.2)
CARDIAC TROPONIN I PNL SERPL HS: <2 NG/L
CHLORIDE SERPL-SCNC: 105 MMOL/L (ref 96–108)
CO2 SERPL-SCNC: 25 MMOL/L (ref 21–32)
CREAT SERPL-MCNC: 0.87 MG/DL (ref 0.6–1.3)
D DIMER PPP FEU-MCNC: 0.77 UG/ML FEU
EOSINOPHIL # BLD AUTO: 0.07 THOUSAND/ΜL (ref 0–0.61)
EOSINOPHIL NFR BLD AUTO: 1 % (ref 0–6)
ERYTHROCYTE [DISTWIDTH] IN BLOOD BY AUTOMATED COUNT: 11.4 % (ref 11.6–15.1)
FLUAV AG UPPER RESP QL IA.RAPID: NEGATIVE
FLUBV AG UPPER RESP QL IA.RAPID: NEGATIVE
GFR SERPL CREATININE-BSD FRML MDRD: 88 ML/MIN/1.73SQ M
GLUCOSE SERPL-MCNC: 102 MG/DL (ref 65–140)
HCG SERPL QL: NEGATIVE
HCT VFR BLD AUTO: 38.6 % (ref 34.8–46.1)
HGB BLD-MCNC: 12.5 G/DL (ref 11.5–15.4)
IMM GRANULOCYTES # BLD AUTO: 0.01 THOUSAND/UL (ref 0–0.2)
IMM GRANULOCYTES NFR BLD AUTO: 0 % (ref 0–2)
LIPASE SERPL-CCNC: 28 U/L (ref 11–82)
LYMPHOCYTES # BLD AUTO: 2.51 THOUSANDS/ΜL (ref 0.6–4.47)
LYMPHOCYTES NFR BLD AUTO: 38 % (ref 14–44)
MCH RBC QN AUTO: 31.7 PG (ref 26.8–34.3)
MCHC RBC AUTO-ENTMCNC: 32.4 G/DL (ref 31.4–37.4)
MCV RBC AUTO: 98 FL (ref 82–98)
MONOCYTES # BLD AUTO: 0.45 THOUSAND/ΜL (ref 0.17–1.22)
MONOCYTES NFR BLD AUTO: 7 % (ref 4–12)
NEUTROPHILS # BLD AUTO: 3.57 THOUSANDS/ΜL (ref 1.85–7.62)
NEUTS SEG NFR BLD AUTO: 53 % (ref 43–75)
NRBC BLD AUTO-RTO: 0 /100 WBCS
PLATELET # BLD AUTO: 268 THOUSANDS/UL (ref 149–390)
PMV BLD AUTO: 10 FL (ref 8.9–12.7)
POTASSIUM SERPL-SCNC: 3.8 MMOL/L (ref 3.5–5.3)
PROT SERPL-MCNC: 7.5 G/DL (ref 6.4–8.4)
RBC # BLD AUTO: 3.94 MILLION/UL (ref 3.81–5.12)
SARS-COV+SARS-COV-2 AG RESP QL IA.RAPID: NEGATIVE
SODIUM SERPL-SCNC: 140 MMOL/L (ref 135–147)
TSH SERPL DL<=0.05 MIU/L-ACNC: 2.17 UIU/ML (ref 0.45–4.5)
WBC # BLD AUTO: 6.67 THOUSAND/UL (ref 4.31–10.16)

## 2024-11-03 PROCEDURE — 84484 ASSAY OF TROPONIN QUANT: CPT

## 2024-11-03 PROCEDURE — 96361 HYDRATE IV INFUSION ADD-ON: CPT

## 2024-11-03 PROCEDURE — 36415 COLL VENOUS BLD VENIPUNCTURE: CPT

## 2024-11-03 PROCEDURE — 80053 COMPREHEN METABOLIC PANEL: CPT

## 2024-11-03 PROCEDURE — 83690 ASSAY OF LIPASE: CPT

## 2024-11-03 PROCEDURE — 84703 CHORIONIC GONADOTROPIN ASSAY: CPT

## 2024-11-03 PROCEDURE — 87811 SARS-COV-2 COVID19 W/OPTIC: CPT

## 2024-11-03 PROCEDURE — 87804 INFLUENZA ASSAY W/OPTIC: CPT

## 2024-11-03 PROCEDURE — 71275 CT ANGIOGRAPHY CHEST: CPT

## 2024-11-03 PROCEDURE — 85379 FIBRIN DEGRADATION QUANT: CPT

## 2024-11-03 PROCEDURE — 85025 COMPLETE CBC W/AUTO DIFF WBC: CPT

## 2024-11-03 PROCEDURE — 96374 THER/PROPH/DIAG INJ IV PUSH: CPT

## 2024-11-03 PROCEDURE — 93005 ELECTROCARDIOGRAM TRACING: CPT

## 2024-11-03 PROCEDURE — 84443 ASSAY THYROID STIM HORMONE: CPT

## 2024-11-03 PROCEDURE — 96375 TX/PRO/DX INJ NEW DRUG ADDON: CPT

## 2024-11-03 PROCEDURE — 99285 EMERGENCY DEPT VISIT HI MDM: CPT | Performed by: EMERGENCY MEDICINE

## 2024-11-03 PROCEDURE — 99284 EMERGENCY DEPT VISIT MOD MDM: CPT

## 2024-11-03 RX ORDER — ACETAMINOPHEN 325 MG/1
975 TABLET ORAL ONCE
Status: COMPLETED | OUTPATIENT
Start: 2024-11-03 | End: 2024-11-03

## 2024-11-03 RX ORDER — KETOROLAC TROMETHAMINE 30 MG/ML
15 INJECTION, SOLUTION INTRAMUSCULAR; INTRAVENOUS ONCE
Status: COMPLETED | OUTPATIENT
Start: 2024-11-03 | End: 2024-11-03

## 2024-11-03 RX ORDER — ONDANSETRON 2 MG/ML
4 INJECTION INTRAMUSCULAR; INTRAVENOUS ONCE
Status: COMPLETED | OUTPATIENT
Start: 2024-11-03 | End: 2024-11-03

## 2024-11-03 RX ADMIN — ACETAMINOPHEN 975 MG: 325 TABLET, FILM COATED ORAL at 16:28

## 2024-11-03 RX ADMIN — KETOROLAC TROMETHAMINE 15 MG: 30 INJECTION, SOLUTION INTRAMUSCULAR at 16:28

## 2024-11-03 RX ADMIN — SODIUM CHLORIDE 1000 ML: 0.9 INJECTION, SOLUTION INTRAVENOUS at 16:27

## 2024-11-03 RX ADMIN — IOHEXOL 85 ML: 350 INJECTION, SOLUTION INTRAVENOUS at 17:01

## 2024-11-03 RX ADMIN — ONDANSETRON 4 MG: 2 INJECTION INTRAMUSCULAR; INTRAVENOUS at 16:28

## 2024-11-03 NOTE — ED ATTENDING ATTESTATION
11/3/2024  IHaily DO, saw and evaluated the patient. I have discussed the patient with the resident/non-physician practitioner and agree with the resident's/non-physician practitioner's findings, Plan of Care, and MDM as documented in the resident's/non-physician practitioner's note, except where noted. All available labs and Radiology studies were reviewed.  I was present for key portions of any procedure(s) performed by the resident/non-physician practitioner and I was immediately available to provide assistance.       At this point I agree with the current assessment done in the Emergency Department.  I have conducted an independent evaluation of this patient a history and physical is as follows:    33yo female presents for evaluation. Over the last few days patient has felt unwell including body aches, sore throat, chest discomfort, and a sensation of dyspnea. Of note, patient was on oral birth control but stopped when she started feeling ill. She reports pain radiating into her left arm . Will assess for ACS, VTE, ptx, pna, electrolyte abnormality, anemia, viral panel as source of her symptoms. Doubt CVA, aortic catastrophe.     Physical Exam  Vitals reviewed.   Constitutional:       General: She is not in acute distress.     Appearance: Normal appearance. She is not ill-appearing, toxic-appearing or diaphoretic.   HENT:      Head: Normocephalic and atraumatic.      Nose: Nose normal.   Eyes:      General: No scleral icterus.        Right eye: No discharge.         Left eye: No discharge.   Cardiovascular:      Rate and Rhythm: Normal rate and regular rhythm.   Pulmonary:      Effort: Pulmonary effort is normal. No respiratory distress.   Musculoskeletal:         General: No deformity or signs of injury.   Skin:     General: Skin is warm.      Coloration: Skin is not jaundiced or pale.   Neurological:      General: No focal deficit present.      Mental Status: She is alert. Mental status is at  baseline.      Gait: Gait normal.           ED Course         Critical Care Time  Procedures

## 2024-11-03 NOTE — ED NOTES
Pt complains of BL extremity tingling and chest pain- EKG done and provider aware      Nicole Aragon RN  11/03/24 [id number]

## 2024-11-03 NOTE — Clinical Note
Duong Elincedelmira was seen and treated in our emergency department on 11/3/2024.                Diagnosis:     Duong  .    She may return on this date: 11/05/2024         If you have any questions or concerns, please don't hesitate to call.      Alex Oliveira MD    ______________________________           _______________          _______________  Hospital Representative                              Date                                Time

## 2024-11-03 NOTE — ED PROVIDER NOTES
Time reflects when diagnosis was documented in both MDM as applicable and the Disposition within this note       Time User Action Codes Description Comment    11/3/2024  5:52 PM Alex Oliveira Add [B34.9] Acute viral syndrome     11/3/2024  5:52 PM Alex Oliveira Add [R79.89] Positive D dimer           ED Disposition       ED Disposition   Discharge    Condition   Stable    Date/Time   Sun Nov 3, 2024  5:51 PM    Comment   Duong Garcia discharge to home/self care.                   Assessment & Plan       Medical Decision Making  34-year-old female presents with multiple symptoms.  Differential includes but not limited to ACS, pneumonia, viral syndrome, VTE  PERC and Wells PE score as above.    Patient is otherwise well-appearing, neurovascularly intact with full range of motion, no acute distress, afebrile, normal cardiopulmonary exam.  Given liter fluids, Tylenol, Toradol, Zofran for symptomatic therapy.  Dimer positive, CTA PE added with no acute abnormalities noted.  Labs within normal limits.  No ischemic changes on EKG with normal troponin.  Heart score 1.  Symptoms over multiple days.  Unlikely ACS.    Patient states feeling improved after multimodal pain management symptoms resolved.  Symptoms likely secondary to viral syndrome.  Discussed supportive therapy.  Discharged home.    Amount and/or Complexity of Data Reviewed  Labs: ordered. Decision-making details documented in ED Course.  Radiology: ordered.    Risk  OTC drugs.  Prescription drug management.        ED Course as of 11/03/24 1806   Sun Nov 03, 2024   1558 EKG normal sinus rhythm rate of 94, normal UT interval, normal QRS interval, QTc 435, normal axis, no ST elevation, no ST depressions, no ischemic changes or STEMI.   1613 D-Dimer, Quant(!): 0.77   1619 Repeat EKG normal sinus rhythm rate of 90, normal UT interval, normal QRS interval, QTc 442, normal axis, no ST elevations, no ST depressions, no ischemic changes or STEMI.        Medications   sodium chloride 0.9 % bolus 1,000 mL (0 mL Intravenous Stopped 11/3/24 1753)   acetaminophen (TYLENOL) tablet 975 mg (975 mg Oral Given 11/3/24 1628)   ketorolac (TORADOL) injection 15 mg (15 mg Intravenous Given 11/3/24 1628)   ondansetron (ZOFRAN) injection 4 mg (4 mg Intravenous Given 11/3/24 1628)   iohexol (OMNIPAQUE) 350 MG/ML injection (MULTI-DOSE) 85 mL (85 mL Intravenous Given 11/3/24 1701)       ED Risk Strat Scores   HEART Risk Score      Flowsheet Row Most Recent Value   Heart Score Risk Calculator    History 1 Filed at: 11/03/2024 1628   ECG 0 Filed at: 11/03/2024 1628   Age 0 Filed at: 11/03/2024 1628   Risk Factors 0 Filed at: 11/03/2024 1628   Troponin 0 Filed at: 11/03/2024 1628   HEART Score 1 Filed at: 11/03/2024 1628                           PERC Rule for PE      Flowsheet Row Most Recent Value   PERC Rule for PE    Age >=50 0 Filed at: 11/03/2024 1545   HR >=100 1 Filed at: 11/03/2024 1545   O2 Sat on room air < 95% 1 Filed at: 11/03/2024 1545   History of PE or DVT 0 Filed at: 11/03/2024 1545   Recent trauma or surgery 0 Filed at: 11/03/2024 1545   Hemoptysis 0 Filed at: 11/03/2024 1545   Exogenous estrogen 0 Filed at: 11/03/2024 1545   Unilateral leg swelling 0 Filed at: 11/03/2024 1545   PERC Rule for PE Results 2 Filed at: 11/03/2024 1545            SBIRT 22yo+      Flowsheet Row Most Recent Value   Initial Alcohol Screen: US AUDIT-C     1. How often do you have a drink containing alcohol? 0 Filed at: 11/03/2024 1541   2. How many drinks containing alcohol do you have on a typical day you are drinking?  0 Filed at: 11/03/2024 1541   3a. Male UNDER 65: How often do you have five or more drinks on one occasion? 0 Filed at: 11/03/2024 1541   3b. FEMALE Any Age, or MALE 65+: How often do you have 4 or more drinks on one occassion? 0 Filed at: 11/03/2024 1541   Audit-C Score 0 Filed at: 11/03/2024 1541   MARIE: How many times in the past year have you...    Used an illegal  drug or used a prescription medication for non-medical reasons? Never Filed at: 11/03/2024 1541            Wells' Criteria for PE      Flowsheet Row Most Recent Value   Wells' Criteria for PE    Clinical signs and symptoms of DVT 0 Filed at: 11/03/2024 1545   PE is primary diagnosis or equally likely 0 Filed at: 11/03/2024 1545   HR >100 1.5 Filed at: 11/03/2024 1545   Immobilization at least 3 days or Surgery in the previous 4 weeks 0 Filed at: 11/03/2024 1545   Previous, objectively diagnosed PE or DVT 0 Filed at: 11/03/2024 1545   Hemoptysis 0 Filed at: 11/03/2024 1545   Malignancy with treatment within 6 months or palliative 0 Filed at: 11/03/2024 1545   Wells' Criteria Total 1.5 Filed at: 11/03/2024 1545                        History of Present Illness       Chief Complaint   Patient presents with    Flu Symptoms     Sore throat, sob, and chest pain stated Wednesday. Stopped birth control on wednesday because she was told if she felt sob to be seen        Past Medical History:   Diagnosis Date    Kidney calculus     Renal disorder       History reviewed. No pertinent surgical history.   History reviewed. No pertinent family history.   Social History     Tobacco Use    Smoking status: Never    Smokeless tobacco: Never   Vaping Use    Vaping status: Never Used   Substance Use Topics    Alcohol use: Never    Drug use: Never      E-Cigarette/Vaping    E-Cigarette Use Never User       E-Cigarette/Vaping Substances      I have reviewed and agree with the history as documented.     34-year-old female presents with multiple symptoms.  Patient states 4 to 5-day history of sore throat, shortness of breath at rest, myalgias, fatigue, chills, chest pains, constipation.   She states that she was advised to discontinue birth control if she had symptoms of chest pain and seek evaluation, discontinued her birth control 5 days ago.  Today had episode of chest discomfort with pain throughout her left arm with sensation that it  felt weaker while she was holding a plate.  Denies medical history.  Denies medication use.  Denies alcohol use, tobacco use, recreational drug use.  Denies previous history of VTE.  Denies fevers, cough, abdominal pain, dysuria, frequency, urgency, diarrhea, leg pain, trauma, change in routine, injury.      Flu Symptoms      Review of Systems   All other systems reviewed and are negative.          Objective       ED Triage Vitals   Temp Pulse Blood Pressure Respirations SpO2 Patient Position - Orthostatic VS   -- 11/03/24 1539 11/03/24 1539 11/03/24 1539 11/03/24 1539 11/03/24 1539    104 137/82 20 94 % Lying      Temp src Heart Rate Source BP Location FiO2 (%) Pain Score    -- -- 11/03/24 1539 -- 11/03/24 1627      Right arm  7      Vitals      Date and Time Temp Pulse SpO2 Resp BP Pain Score FACES Pain Rating User   11/03/24 1627 -- -- -- -- -- 7 -- BW   11/03/24 1539 -- 104 94 % 20 137/82 -- -- BW            Physical Exam  Vitals and nursing note reviewed.   Constitutional:       General: She is not in acute distress.     Appearance: Normal appearance. She is obese. She is not ill-appearing, toxic-appearing or diaphoretic.   HENT:      Head: Normocephalic and atraumatic.      Right Ear: External ear normal.      Left Ear: External ear normal.      Nose: Nose normal.      Mouth/Throat:      Mouth: Mucous membranes are moist.      Pharynx: Oropharynx is clear.      Comments: Uvula midline.  Normal phonation.  Handling secretions.  Eyes:      General: No scleral icterus.        Right eye: No discharge.         Left eye: No discharge.      Extraocular Movements: Extraocular movements intact.      Conjunctiva/sclera: Conjunctivae normal.   Neck:      Comments: No midline C/T/L/S spine tenderness, step-offs, deformities.  Full range of motion of the cervical spine without tenderness.    Cardiovascular:      Rate and Rhythm: Normal rate and regular rhythm.      Pulses: Normal pulses.      Heart sounds: Normal heart  sounds. No murmur heard.  Pulmonary:      Effort: Pulmonary effort is normal. No respiratory distress.      Breath sounds: Normal breath sounds. No stridor. No rhonchi.   Chest:      Chest wall: No tenderness.   Musculoskeletal:         General: No swelling, tenderness or signs of injury. Normal range of motion.      Cervical back: Normal range of motion and neck supple. No rigidity or tenderness.      Right lower leg: No edema.      Comments: Compartments of the upper extremities are soft and nontender.  Radial pulses 2+ bilaterally.  Full range of motion at the shoulder, elbow, wrist bilaterally.  Sensation to light touch intact over distal extremities.     Lymphadenopathy:      Cervical: No cervical adenopathy.   Skin:     General: Skin is warm and dry.      Capillary Refill: Capillary refill takes less than 2 seconds.      Coloration: Skin is not cyanotic, jaundiced or pale.      Findings: No bruising, erythema, lesion, petechiae or rash.   Neurological:      General: No focal deficit present.      Mental Status: She is alert and oriented to person, place, and time. Mental status is at baseline.      Sensory: No sensory deficit.      Motor: No weakness.      Gait: Gait normal.         Results Reviewed       Procedure Component Value Units Date/Time    Lipase [[id number]]  (Normal) Collected: 11/03/24 1550    Lab Status: Final result Specimen: Blood from Arm, Left Updated: 11/03/24 1633     Lipase 28 u/L     Comprehensive metabolic panel [[id number]] Collected: 11/03/24 1550    Lab Status: Final result Specimen: Blood from Arm, Left Updated: 11/03/24 1633     Sodium 140 mmol/L      Potassium 3.8 mmol/L      Chloride 105 mmol/L      CO2 25 mmol/L      ANION GAP 10 mmol/L      BUN 9 mg/dL      Creatinine 0.87 mg/dL      Glucose 102 mg/dL      Calcium 9.6 mg/dL      AST 16 U/L      ALT 20 U/L      Alkaline Phosphatase 35 U/L      Total Protein 7.5 g/dL      Albumin 4.6 g/dL      Total Bilirubin 0.34 mg/dL      eGFR 88  ml/min/1.73sq m     Narrative:      National Kid[employer] guidelines for Chronic Kidney Disease (CKD):     Stage 1 with normal or high GFR (GFR > 90 mL/min/1.73 square meters)    Stage 2 Mild CKD (GFR = 60-89 mL/min/1.73 square meters)    Stage 3A Moderate CKD (GFR = 45-59 mL/min/1.73 square meters)    Stage 3B Moderate CKD (GFR = 30-44 mL/min/1.73 square meters)    Stage 4 Severe CKD (GFR = 15-29 mL/min/1.73 square meters)    Stage 5 End Stage CKD (GFR <15 mL/min/1.73 square meters)  Note: GFR calculation is accurate only with a steady state creatinine    TSH, 3rd generation with Free T4 reflex [[id number]]  (Normal) Collected: 11/03/24 1550    Lab Status: Final result Specimen: Blood from Arm, Left Updated: 11/03/24 1632     TSH 3RD GENERATON 2.170 uIU/mL     hCG, qualitative pregnancy [[id number]]  (Normal) Collected: 11/03/24 1550    Lab Status: Final result Specimen: Blood from Arm, Left Updated: 11/03/24 1632     Preg, Serum Negative    HS Troponin 0hr (reflex protocol) [[id number]]  (Normal) Collected: 11/03/24 1550    Lab Status: Final result Specimen: Blood from Arm, Right Updated: 11/03/24 1623     hs TnI 0hr <2 ng/L     FLU/COVID Rapid Antigen (30 min. TAT) - Preferred screening test in ED [[id number]]  (Normal) Collected: 11/03/24 1550    Lab Status: Final result Specimen: Nares from Nose Updated: 11/03/24 1617     SARS COV Rapid Antigen Negative     Influenza A Rapid Antigen Negative     Influenza B Rapid Antigen Negative    Narrative:      This test has been performed using the Quidel Anuja 2 FLU+SARS Antigen test under the Emergency Use Authorization (EUA). This test has been validated by the  and verified by the performing laboratory. The Anuja uses lateral flow immunofluorescent sandwich assay to detect SARS-COV, Influenza A and Influenza B Antigen.     The Quidel Anuja 2 SARS Antigen test does not differentiate between SARS-CoV and SARS-CoV-2.     Negative results are  presumptive and may be confirmed with a molecular assay, if necessary, for patient management. Negative results do not rule out SARS-CoV-2 or influenza infection and should not be used as the sole basis for treatment or patient management decisions. A negative test result may occur if the level of antigen in a sample is below the limit of detection of this test.     Positive results are indicative of the presence of viral antigens, but do not rule out bacterial infection or co-infection with other viruses.     All test results should be used as an adjunct to clinical observations and other information available to the provider.    FOR PEDIATRIC PATIENTS - copy/paste COVID Guidelines URL to browser: [email or web address]    D-Dimer [[id number]]  (Abnormal) Collected: 11/03/24 1550    Lab Status: Final result Specimen: Blood from Arm, Left Updated: 11/03/24 1611     D-Dimer, Quant 0.77 ug/ml FEU     CBC and differential [[id number]]  (Abnormal) Collected: 11/03/24 1550    Lab Status: Final result Specimen: Blood from Arm, Right Updated: 11/03/24 1559     WBC 6.67 Thousand/uL      RBC 3.94 Million/uL      Hemoglobin 12.5 g/dL      Hematocrit 38.6 %      MCV 98 fL      MCH 31.7 pg      MCHC 32.4 g/dL      RDW 11.4 %      MPV 10.0 fL      Platelets 268 Thousands/uL      nRBC 0 /100 WBCs      Segmented % 53 %      Immature Grans % 0 %      Lymphocytes % 38 %      Monocytes % 7 %      Eosinophils Relative 1 %      Basophils Relative 1 %      Absolute Neutrophils 3.57 Thousands/µL      Absolute Immature Grans 0.01 Thousand/uL      Absolute Lymphocytes 2.51 Thousands/µL      Absolute Monocytes 0.45 Thousand/µL      Eosinophils Absolute 0.07 Thousand/µL      Basophils Absolute 0.06 Thousands/µL             CTA chest pe study   Final Interpretation by Kong Gutierrez MD (11/03 1720)      No identifiable acute abnormality to account for the patient's clinical  presentation.                  Workstation performed: [id number]             Procedures    ED Medication and Procedure Management   Prior to Admission Medications   Prescriptions Last Dose Informant Patient Reported? Taking?   cetirizine (ZyrTEC) 10 mg tablet   No No   Sig: TAKE 1 TABLET BY MOUTH DAILY AT BEDTIME   cholecalciferol (VITAMIN D3) 25 mcg (1,000 units) tablet   No No   Sig: Take 1 tablet (1,000 Units total) by mouth daily   psyllium (METAMUCIL) 58.6 % packet   Yes No   Sig: Take 1 packet by mouth daily      Facility-Administered Medications: None     Discharge Medication List as of 11/3/2024  5:52 PM        CONTINUE these medications which have NOT CHANGED    Details   cetirizine (ZyrTEC) 10 mg tablet TAKE 1 TABLET BY MOUTH DAILY AT BEDTIME, Starting Thu 9/12/2024, Normal      cholecalciferol (VITAMIN D3) 25 mcg (1,000 units) tablet Take 1 tablet (1,000 Units total) by mouth daily, Starting Fri 8/30/2024, Normal      psyllium (METAMUCIL) 58.6 % packet Take 1 packet by mouth daily, Historical Med           No discharge procedures on file.  ED SEPSIS DOCUMENTATION   Time reflects when diagnosis was documented in both MDM as applicable and the Disposition within this note       Time User Action Codes Description Comment    11/3/2024  5:52 PM Alex Oliveira [B34.9] Acute viral syndrome     11/3/2024  5:52 PM Alex Oliveira [R79.89] Positive D dimer                  Alex Oliveira MD  11/03/24 4363

## 2024-11-03 NOTE — DISCHARGE INSTRUCTIONS
-Cold like symptoms are self-limited and may last up to 14 days, and cough may last up to 21 days. Stay well hydrated. Wash and clean hands regularly to prevent new infection and spreading of infection. Tylenol and ibuprofen as needed for fever, and/or muscle aches. You may attempt Neti pot, cool mist humidifier, hot showers, nasal spray, or nasal saline for congestion. Diphenhydramine/Benadryl prior to bed for congestion or runny nose. Pectin based lozenges or ice chips as need for throat irritation. Honey may help relieve symptoms of sore throat, but do not give honey to an infant younger than 1 year old. You may also attempt topical application of ointments containing camphor, menthol, and eucalyptus oils as needed.   -Please follow up w/ your primary care provider concerning high fevers >101, severe headaches, shortness of breath, or continued/worsening symptoms.

## 2024-11-04 LAB
ATRIAL RATE: 90 BPM
ATRIAL RATE: 94 BPM
P AXIS: 62 DEGREES
P AXIS: 68 DEGREES
PR INTERVAL: 154 MS
PR INTERVAL: 156 MS
QRS AXIS: 69 DEGREES
QRS AXIS: 74 DEGREES
QRSD INTERVAL: 86 MS
QRSD INTERVAL: 88 MS
QT INTERVAL: 348 MS
QT INTERVAL: 360 MS
QTC INTERVAL: 435 MS
QTC INTERVAL: 440 MS
T WAVE AXIS: 30 DEGREES
T WAVE AXIS: 33 DEGREES
VENTRICULAR RATE: 90 BPM
VENTRICULAR RATE: 94 BPM

## 2024-11-04 PROCEDURE — 93010 ELECTROCARDIOGRAM REPORT: CPT | Performed by: INTERNAL MEDICINE

## 2025-03-21 ENCOUNTER — TELEPHONE (OUTPATIENT)
Dept: FAMILY MEDICINE CLINIC | Facility: CLINIC | Age: 35
End: 2025-03-21